# Patient Record
Sex: MALE | Race: WHITE | NOT HISPANIC OR LATINO | Employment: FULL TIME | URBAN - NONMETROPOLITAN AREA
[De-identification: names, ages, dates, MRNs, and addresses within clinical notes are randomized per-mention and may not be internally consistent; named-entity substitution may affect disease eponyms.]

---

## 2020-09-29 ENCOUNTER — TELEPHONE (OUTPATIENT)
Dept: OTOLARYNGOLOGY | Facility: CLINIC | Age: 50
End: 2020-09-29

## 2020-09-29 NOTE — TELEPHONE ENCOUNTER
Called Patient to schedule appt, Patient stated he wants to see DR. Nair, Sent to Dr. Nair's nurse.

## 2020-11-04 ENCOUNTER — OFFICE VISIT (OUTPATIENT)
Dept: OTOLARYNGOLOGY | Facility: CLINIC | Age: 50
End: 2020-11-04

## 2020-11-04 VITALS
HEART RATE: 83 BPM | SYSTOLIC BLOOD PRESSURE: 150 MMHG | WEIGHT: 245 LBS | DIASTOLIC BLOOD PRESSURE: 79 MMHG | TEMPERATURE: 98 F

## 2020-11-04 DIAGNOSIS — R43.8 HYPOGEUSIA: Primary | ICD-10-CM

## 2020-11-04 DIAGNOSIS — J34.2 NASAL SEPTAL DEVIATION: ICD-10-CM

## 2020-11-04 DIAGNOSIS — J30.9 ALLERGIC RHINITIS, UNSPECIFIED SEASONALITY, UNSPECIFIED TRIGGER: ICD-10-CM

## 2020-11-04 DIAGNOSIS — R43.8 HYPOSMIA: ICD-10-CM

## 2020-11-04 PROCEDURE — 99203 OFFICE O/P NEW LOW 30 MIN: CPT | Performed by: NURSE PRACTITIONER

## 2020-11-04 RX ORDER — FLUTICASONE PROPIONATE 50 MCG
2 SPRAY, SUSPENSION (ML) NASAL DAILY
Qty: 16 G | Refills: 6 | Status: SHIPPED | OUTPATIENT
Start: 2020-11-04 | End: 2020-12-16 | Stop reason: SDUPTHER

## 2020-11-04 RX ORDER — METHYLPREDNISOLONE 4 MG/1
TABLET ORAL
Qty: 1 EACH | Refills: 0 | Status: SHIPPED | OUTPATIENT
Start: 2020-11-04 | End: 2020-12-16

## 2020-11-04 RX ORDER — AZELASTINE 1 MG/ML
2 SPRAY, METERED NASAL 2 TIMES DAILY
Qty: 30 ML | Refills: 6 | Status: SHIPPED | OUTPATIENT
Start: 2020-11-04 | End: 2020-12-04

## 2020-11-04 RX ORDER — AMOXICILLIN AND CLAVULANATE POTASSIUM 875; 125 MG/1; MG/1
1 TABLET, FILM COATED ORAL 2 TIMES DAILY
Qty: 20 TABLET | Refills: 0 | Status: SHIPPED | OUTPATIENT
Start: 2020-11-04 | End: 2020-11-14

## 2020-11-19 NOTE — PROGRESS NOTES
PRIMARY CARE PROVIDER: Yohana Ayers APRN  REFERRING PROVIDER: No ref. provider found    Chief Complaint   Patient presents with   • Sinus Problem       Subjective   History of Present Illness:  Barrington Barclay is a  50 y.o. male who complains of hyposmia and hypogeusia. The symptoms are not localized to a particular location. The patient has had mild to moderate symptoms. The symptoms have been present for the last 6 years.  He reports his symptoms began after he quit smoking.  He feels his symptoms are progressively worsening.  He also has associated nasal congestion and nasal drainage.  He has been treated with antibiotics and steroids which improved his symptoms but his symptoms quickly return after completion of the medication.    Review of Systems:  Review of Systems   Constitutional: Negative for chills and fever.   HENT: Positive for congestion and rhinorrhea. Negative for ear discharge, ear pain and sore throat.    Respiratory: Negative for cough and shortness of breath.    Cardiovascular: Negative for chest pain.   Gastrointestinal: Negative for diarrhea, nausea and vomiting.       Past History:  History reviewed. No pertinent past medical history.  Past Surgical History:   Procedure Laterality Date   • KNEE SURGERY       Family History   Problem Relation Age of Onset   • Cancer Father    • Cancer Paternal Grandmother    • Cancer Paternal Grandfather      Social History     Tobacco Use   • Smoking status: Former Smoker   • Smokeless tobacco: Current User     Types: Snuff   Substance Use Topics   • Alcohol use: Not on file   • Drug use: Never     Allergies:  Patient has no known allergies.    Current Outpatient Medications:   •  azelastine (ASTELIN) 0.1 % nasal spray, 2 sprays into the nostril(s) as directed by provider 2 (Two) Times a Day for 30 days., Disp: 30 mL, Rfl: 6  •  fluticasone (FLONASE) 50 MCG/ACT nasal spray, 2 sprays into the nostril(s) as directed by provider Daily for 30 days.,  Disp: 16 g, Rfl: 6  •  methylPREDNISolone (Medrol) 4 MG dose pack, Take as directed on package instructions., Disp: 1 each, Rfl: 0      Objective     Vital Signs:    /79   Pulse 83   Temp 98 °F (36.7 °C) (Temporal)   Wt 111 kg (245 lb)     Physical Exam:  Physical Exam  CONSTITUTIONAL: well nourished, well-developed, alert, oriented, in no acute distress   COMMUNICATION AND VOICE: able to communicate normally, normal voice quality  HEAD: normocephalic, no lesions, atraumatic, no tenderness, no masses   FACE: appearance normal, no lesions, no tenderness, no deformities, facial motion symmetric  SALIVARY GLANDS: parotid glands with no tenderness, no swelling, no masses, submandibular glands with normal size, nontender  EYES: ocular motility normal, eyelids normal, orbits normal, no proptosis, conjunctiva normal , pupils equal, round   EARS:  Hearing: response to conversational voice normal bilaterally   External Ears: auricles without lesions  Otoscopic: tympanic membrane appearance normal, no lesions, no perforation, normal mobility, no fluid  NOSE:  External Nose: structure normal, no tenderness on palpation, no nasal discharge, no lesions, no evidence of trauma, nostrils patent   Intranasal Exam: nasal mucosa with mild inflammation, vestibule within normal limits, inferior turbinate normal, nasal septum with left-sided deviation  ORAL:  Lips: upper and lower lips without lesion   NECK: neck appearance normal, no masses or tenderness  LYMPH NODES: no lymphadenopathy  CHEST/RESPIRATORY: respiratory effort normal, normal breath sounds   CARDIOVASCULAR: rate and rhythm normal, extremities without cyanosis or edema    NEUROLOGIC/PSYCHIATRIC: oriented to time, place and person, mood normal, affect appropriate, CN II-XII intact grossly      Assessment   Assessment:  1. Hypogeusia    2. Hyposmia    3. Allergic rhinitis, unspecified seasonality, unspecified trigger    4. Nasal septal deviation        Plan    Plan:    New Medications Ordered This Visit   Medications   • amoxicillin-clavulanate (AUGMENTIN) 875-125 MG per tablet     Sig: Take 1 tablet by mouth 2 (Two) Times a Day for 10 days.     Dispense:  20 tablet     Refill:  0   • methylPREDNISolone (Medrol) 4 MG dose pack     Sig: Take as directed on package instructions.     Dispense:  1 each     Refill:  0   • fluticasone (FLONASE) 50 MCG/ACT nasal spray     Si sprays into the nostril(s) as directed by provider Daily for 30 days.     Dispense:  16 g     Refill:  6   • azelastine (ASTELIN) 0.1 % nasal spray     Si sprays into the nostril(s) as directed by provider 2 (Two) Times a Day for 30 days.     Dispense:  30 mL     Refill:  6     We will add medications and get a CT sinus after completion of treatment to asess the sinuses.  Due to your decreased sense of smell, the use of smoke alarms and warning devices for gas leaks is strongly recommended.   He may also need evaluation with flexible fiberoptic laryngoscopy on follow-up.  Will need to be Covid tested prior.  He was instructed to call return for any problems worsening symptoms.    There are no Patient Instructions on file for this visit.  Return in about 6 weeks (around 2020) for Recheck, With Dr. Nair.    My findings and recommendations were discussed and questions were answered.     KELLIE Rivero

## 2020-12-10 ENCOUNTER — HOSPITAL ENCOUNTER (OUTPATIENT)
Dept: CT IMAGING | Facility: HOSPITAL | Age: 50
Discharge: HOME | End: 2020-12-10
Admitting: NURSE PRACTITIONER

## 2020-12-10 PROCEDURE — 70486 CT MAXILLOFACIAL W/O DYE: CPT

## 2020-12-15 PROBLEM — J33.9 NASAL POLYPOSIS: Chronic | Status: ACTIVE | Noted: 2020-12-15

## 2020-12-15 PROBLEM — J32.9 CHRONIC SINUSITIS: Chronic | Status: ACTIVE | Noted: 2020-12-15

## 2020-12-15 PROBLEM — J34.3 HYPERTROPHY OF INFERIOR NASAL TURBINATE: Status: ACTIVE | Noted: 2020-12-15

## 2020-12-15 PROBLEM — J33.9 NASAL POLYPOSIS: Status: ACTIVE | Noted: 2020-12-15

## 2020-12-15 PROBLEM — J32.9 CHRONIC SINUSITIS: Status: ACTIVE | Noted: 2020-12-15

## 2020-12-15 PROBLEM — R43.8 HYPOSMIA: Chronic | Status: ACTIVE | Noted: 2020-12-15

## 2020-12-15 PROBLEM — R43.8 HYPOSMIA: Status: ACTIVE | Noted: 2020-12-15

## 2020-12-15 PROBLEM — J34.2 NASAL SEPTAL DEVIATION: Status: ACTIVE | Noted: 2020-12-15

## 2020-12-15 RX ORDER — PREDNISONE 10 MG/1
TABLET ORAL
Qty: 30 TABLET | Refills: 0 | Status: CANCELLED | OUTPATIENT
Start: 2020-12-15

## 2020-12-15 RX ORDER — AMOXICILLIN AND CLAVULANATE POTASSIUM 875; 125 MG/1; MG/1
1 TABLET, FILM COATED ORAL EVERY 12 HOURS SCHEDULED
Qty: 20 TABLET | Refills: 0 | Status: CANCELLED | OUTPATIENT
Start: 2020-12-15 | End: 2020-12-25

## 2020-12-15 NOTE — PROGRESS NOTES
Ney Nair MD     Chief Complaint   Patient presents with   • hypogeusia          HPI  Barrington Barclay is a  50 y.o. male who is here for follow up.  He has had a history of decreased sense of smell and taste with nasal congestion and drainage.  He was seen on November 4, 2020 by the midlevel practitioner and a CT scan was performed. He  reports that he has quit smoking. His smokeless tobacco use includes snuff.  He reports that he is doing better with medication.  His symptoms are now tolerable.         Review of Systems   Constitutional: Negative for chills and fever.   HENT: Negative for ear pain, sinus pressure and sinus pain.    Gastrointestinal: Negative for diarrhea, nausea and vomiting.      I have reviewed the ROS as documented by the MA/LPN/RN Ney Nair MD     Past History:   Past medical and surgical history, family history and social history reviewed and updated when appropriate.  Current medications and allergies reviewed and updated when appropriate.  Allergies:  Patient has no known allergies.        Vital Signs:   Temp:  [97.1 °F (36.2 °C)] 97.1 °F (36.2 °C)  Heart Rate:  [83] 83  BP: (139)/(85) 139/85    Physical Exam  Vitals signs reviewed.   Constitutional:       Appearance: Normal appearance. He is well-developed.   HENT:      Head: Normocephalic and atraumatic.      Right Ear: External ear normal.      Left Ear: External ear normal.      Nose: Mucosal edema, congestion and rhinorrhea present.   Eyes:      Extraocular Movements: Extraocular movements intact.      Conjunctiva/sclera: Conjunctivae normal.   Neck:      Musculoskeletal: Normal range of motion.   Pulmonary:      Effort: Pulmonary effort is normal. No respiratory distress.      Breath sounds: No stridor.   Musculoskeletal: Normal range of motion.   Skin:     Findings: No rash.   Neurological:      General: No focal deficit present.      Mental Status: He is alert.      Cranial Nerves: No cranial nerve deficit.      Psychiatric:         Behavior: Behavior normal.         Thought Content: Thought content normal.         Judgment: Judgment normal.            RESULTS REVIEW:    I have reviewed the patients old records in the chart.  The following results/records were reviewed:   CT Sinus Without Contrast (12/10/2020 13:21) -there is a deviated nasal septum off to the left side.  There is mucosal thickening of the bilateral maxillary sinuses with blockage of the ostiomeatal complex.  There is evidence of bilateral ethmoid disease which is scattered but in the anterior and posterior ethmoid cells.  There is evidence of polyposis of the anterior nasal cavity.            Assessment:    1. Chronic sinusitis, unspecified location    2. Nasal polyposis    3. Nasal septal deviation    4. Hypertrophy of inferior nasal turbinate    5. Hyposmia            Plan:    At some point time, he will benefit from functional scopic sinus surgery.  Since he is doing better with medication, we will continue these for the time being.  I have offered him allergy testing.  He will consider.          New Medications Ordered This Visit   Medications   • fluticasone (FLONASE) 50 MCG/ACT nasal spray     Si sprays into the nostril(s) as directed by provider Daily for 30 days.     Dispense:  16 g     Refill:  6   • azelastine (ASTELIN) 0.1 % nasal spray     Si sprays into the nostril(s) as directed by provider 2 (Two) Times a Day for 30 days. Use in each nostril as directed     Dispense:  30 mL     Refill:  6          Return in about 4 months (around 2021).       Ney Nair MD  20  10:16 CST

## 2020-12-16 ENCOUNTER — OFFICE VISIT (OUTPATIENT)
Dept: OTOLARYNGOLOGY | Facility: CLINIC | Age: 50
End: 2020-12-16

## 2020-12-16 VITALS
WEIGHT: 245 LBS | DIASTOLIC BLOOD PRESSURE: 85 MMHG | HEART RATE: 83 BPM | TEMPERATURE: 97.1 F | SYSTOLIC BLOOD PRESSURE: 139 MMHG

## 2020-12-16 DIAGNOSIS — J33.9 NASAL POLYPOSIS: ICD-10-CM

## 2020-12-16 DIAGNOSIS — J32.9 CHRONIC SINUSITIS, UNSPECIFIED LOCATION: Primary | ICD-10-CM

## 2020-12-16 DIAGNOSIS — J34.2 NASAL SEPTAL DEVIATION: ICD-10-CM

## 2020-12-16 DIAGNOSIS — J34.3 HYPERTROPHY OF INFERIOR NASAL TURBINATE: ICD-10-CM

## 2020-12-16 DIAGNOSIS — R43.8 HYPOSMIA: ICD-10-CM

## 2020-12-16 PROCEDURE — 99213 OFFICE O/P EST LOW 20 MIN: CPT | Performed by: OTOLARYNGOLOGY

## 2020-12-16 RX ORDER — AZELASTINE 1 MG/ML
2 SPRAY, METERED NASAL 2 TIMES DAILY
Qty: 30 ML | Refills: 6 | Status: SHIPPED | OUTPATIENT
Start: 2020-12-16 | End: 2021-01-15

## 2020-12-16 RX ORDER — FLUTICASONE PROPIONATE 50 MCG
2 SPRAY, SUSPENSION (ML) NASAL DAILY
Qty: 16 G | Refills: 6 | Status: SHIPPED | OUTPATIENT
Start: 2020-12-16 | End: 2021-01-15

## 2020-12-16 NOTE — PATIENT INSTRUCTIONS
CONTACT INFORMATION:  The main office phone number is [phone]. For emergencies after hours and on weekends, this number will convert over to our answering service and the on call provider will answer. Please try to keep non emergent phone calls/ questions to office hours 9am-5pm Monday through Friday.     [facility]  As an alternative, you can sign up and use the Epic MyChart system for more direct and quicker access for non emergent questions/ problems.  "[employer] [facility] allows you to send messages to your doctor, view your test results, renew your prescriptions, schedule appointments, and more. To sign up, go to [employer] and click on the Sign Up Now link in the New User? box. Enter your [facility] Activation Code exactly as it appears below along with the last four digits of your Social Security Number and your Date of Birth () to complete the sign-up process. If you do not sign up before the expiration date, you must request a new code.    [facility] Activation Code: [email or web address]  Expires: 2021 12:30 PM    If you have questions, you can email [email or web address] or call [phone] to talk to our HealthLoop staff. Remember, HealthLoop is NOT to be used for urgent needs. For medical emergencies, dial 911.      IF YOU SMOKE OR USE TOBACCO PLEASE READ THE FOLLOWING:  Why is smoking bad for me?  Smoking increases the risk of heart disease, lung disease, vascular disease, stroke, and cancer. If you smoke, STOP!    For more information:  Quit Now [place]  -QUIT-NOW  [email or web address]